# Patient Record
Sex: MALE | Race: AMERICAN INDIAN OR ALASKA NATIVE
[De-identification: names, ages, dates, MRNs, and addresses within clinical notes are randomized per-mention and may not be internally consistent; named-entity substitution may affect disease eponyms.]

---

## 2018-01-18 ENCOUNTER — HOSPITAL ENCOUNTER (EMERGENCY)
Dept: HOSPITAL 5 - ED | Age: 43
Discharge: HOME | End: 2018-01-18
Payer: COMMERCIAL

## 2018-01-18 VITALS — SYSTOLIC BLOOD PRESSURE: 131 MMHG | DIASTOLIC BLOOD PRESSURE: 84 MMHG

## 2018-01-18 DIAGNOSIS — J45.50: ICD-10-CM

## 2018-01-18 DIAGNOSIS — J20.9: Primary | ICD-10-CM

## 2018-01-18 LAB
BASOPHILS # (AUTO): 0 K/MM3 (ref 0–0.1)
BASOPHILS NFR BLD AUTO: 0.8 % (ref 0–1.8)
BUN SERPL-MCNC: 10 MG/DL (ref 9–20)
BUN/CREAT SERPL: 11 %
CALCIUM SERPL-MCNC: 9.3 MG/DL (ref 8.4–10.2)
EOSINOPHIL # BLD AUTO: 0.7 K/MM3 (ref 0–0.4)
EOSINOPHIL NFR BLD AUTO: 11 % (ref 0–4.3)
HCT VFR BLD CALC: 45.7 % (ref 35.5–45.6)
HEMOLYSIS INDEX: 11
HGB BLD-MCNC: 15.4 GM/DL (ref 11.8–15.2)
LYMPHOCYTES # BLD AUTO: 1.3 K/MM3 (ref 1.2–5.4)
LYMPHOCYTES NFR BLD AUTO: 22.4 % (ref 13.4–35)
MCH RBC QN AUTO: 30 PG (ref 28–32)
MCHC RBC AUTO-ENTMCNC: 34 % (ref 32–34)
MCV RBC AUTO: 87 FL (ref 84–94)
MONOCYTES # (AUTO): 0.6 K/MM3 (ref 0–0.8)
MONOCYTES % (AUTO): 10.9 % (ref 0–7.3)
PLATELET # BLD: 213 K/MM3 (ref 140–440)
RBC # BLD AUTO: 5.23 M/MM3 (ref 3.65–5.03)

## 2018-01-18 PROCEDURE — 71046 X-RAY EXAM CHEST 2 VIEWS: CPT

## 2018-01-18 PROCEDURE — 85379 FIBRIN DEGRADATION QUANT: CPT

## 2018-01-18 PROCEDURE — 80048 BASIC METABOLIC PNL TOTAL CA: CPT

## 2018-01-18 PROCEDURE — 94640 AIRWAY INHALATION TREATMENT: CPT

## 2018-01-18 PROCEDURE — 36415 COLL VENOUS BLD VENIPUNCTURE: CPT

## 2018-01-18 PROCEDURE — 85025 COMPLETE CBC W/AUTO DIFF WBC: CPT

## 2018-01-18 NOTE — XRAY REPORT
FINAL REPORT



PROCEDURE:  XR CHEST ROUTINE 2V



TECHNIQUE:  Two view PA lateral 



HISTORY:  persistent cough 



COMPARISON:  No prior studies are available for comparison.



FINDINGS:  

 Heart is not enlarged. Lungs are clear. No infiltrate seen 



IMPRESSION:  

Negative study

## 2018-01-18 NOTE — EMERGENCY DEPARTMENT REPORT
ED Shortness of Breath HPI





- General


Chief Complaint: Upper Respiratory Infection


Stated Complaint: COUGHING WHEEZING


Time Seen by Provider: 01/18/18 17:08


Source: patient, family


Mode of arrival: Ambulatory


Limitations: No Limitations





- History of Present Illness


Initial Comments: 


42-year-old male past medical history H. pylori, ?  Reactive airway disease 

presents with complaint of 2-3 days of intermittent wheezing.  Patient states 

he has had this issue intermittently for several months.  Patient is awake 

alert and oriented 3 fully lucid speaking in full sentences no audible 

wheezing or stridor no trismus and no drooling no visible dyspnea or 

respiratory retractions.  Patient denies chest pain abdominal pain nausea 

vomiting bodyaches sore throat fever or chills.  Denies earache.  Denies 

rhinorrhea.  Primarily complaining of intermittent cough and wheezing for 2-3 

days.  Patient denies being a smoker denies any drug use.  Accompanied by 

sister at bedside.  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 

 which he states may be associated with his respiratory 

issue.  States he was seen in urgent care center and given an albuterol inhaler 

with minimal relief of his wheezing recently.  Also states he has a history of 

acid reflux.


MD Complaint: cough


Onset/Timing: 3


-: days(s)


Consistency: intermittent


Improves With: bronchodilators


Context: allergen exposure (possible diesel fume expsoure)


Associated Symptoms: cough, sputum production


Treatments Prior to Arrival: none





- Related Data


Home Oxygen Therapy: No


 Previous Rx's











 Medication  Instructions  Recorded  Last Taken  Type


 


ALBUTEROL NEB's [Proventil 0.083% 2.5 mg IH Q4H PRN #1 box 01/18/18 Unknown Rx





NEBS]    


 


Azithromycin [Zithromax Z-RENALDO] 250 mg PO QDAY #1 pack 01/18/18 Unknown Rx


 


Benzonatate [Tessalon Perles] 100 mg PO Q8HR PRN #30 capsule 01/18/18 Unknown Rx


 


Esomeprazole Magnesium [Nexium 20 mg PO QDAY #30 capsule. 01/18/18 Unknown Rx





24Hr]    


 


Nebulizer Accessories [Sootheneb 1 each MC Q4H PRN #1 box 01/18/18 Unknown Rx





[id number] Adult Mask]    


 


Nebulizer [Compact Compressor 1 each MC Q4H PRN #1 each 01/18/18 Unknown Rx





Nebulizer]    


 


predniSONE [Deltasone] 40 mg PO QDAY #10 tab 01/18/18 Unknown Rx











 Allergies











Allergy/AdvReac Type Severity Reaction Status Date / Time


 


No Known Allergies Allergy   Unverified 01/18/18 11:40














ED Review of Systems


ROS: 


Stated complaint: COUGHING WHEEZING


Other details as noted in HPI





Constitutional: denies: chills, fever


Eyes: denies: eye pain, eye discharge, vision change


ENT: denies: ear pain, throat pain


Respiratory: cough, wheezing.  denies: shortness of breath


Cardiovascular: denies: chest pain, palpitations


Endocrine: no symptoms reported


Gastrointestinal: denies: abdominal pain, nausea, diarrhea


Genitourinary: denies: urgency, dysuria


Musculoskeletal: denies: back pain, joint swelling, arthralgia


Skin: denies: rash, lesions


Neurological: denies: headache, weakness, paresthesias


Psychiatric: denies: anxiety, depression


Hematological/Lymphatic: denies: easy bleeding, easy bruising





ED Past Medical Hx





- Past Medical History


Previous Medical History?: No





- Surgical History


Past Surgical History?: No





- Social History


Smoking Status: Never Smoker


Substance Use Type: Alcohol





- Medications


Home Medications: 


 Home Medications











 Medication  Instructions  Recorded  Confirmed  Last Taken  Type


 


ALBUTEROL NEB's [Proventil 0.083% 2.5 mg IH Q4H PRN #1 box 01/18/18  Unknown Rx





NEBS]     


 


Azithromycin [Zithromax Z-RENALDO] 250 mg PO QDAY #1 pack 01/18/18  Unknown Rx


 


Benzonatate [Tessalon Perles] 100 mg PO Q8HR PRN #30 capsule 01/18/18  Unknown 

Rx


 


Esomeprazole Magnesium [Nexium 20 mg PO QDAY #30 capsule.dr 01/18/18  Unknown Rx





24Hr]     


 


Nebulizer Accessories [Sootheneb 1 each MC Q4H PRN #1 box 01/18/18  Unknown Rx





[id number] Adult Mask]     


 


Nebulizer [Compact Compressor 1 each MC Q4H PRN #1 each 01/18/18  Unknown Rx





Nebulizer]     


 


predniSONE [Deltasone] 40 mg PO QDAY #10 tab 01/18/18  Unknown Rx














ED Physical Exam





- General


Limitations: No Limitations


General appearance: alert, in no apparent distress





- Head


Head exam: Present: atraumatic, normocephalic





- Eye


Eye exam: Present: normal appearance, PERRL, EOMI





- ENT


ENT exam: Present: mucous membranes moist





- Neck


Neck exam: Present: normal inspection





- Respiratory


Respiratory exam: Present: wheezes (fine bilateral wheezing both lung fields).  

Absent: respiratory distress





- Cardiovascular


Cardiovascular Exam: Present: regular rate, normal rhythm.  Absent: systolic 

murmur, diastolic murmur, rubs, gallop





- GI/Abdominal


GI/Abdominal exam: Present: soft (abdomen soft and nontender), normal bowel 

sounds





- Rectal


Rectal exam: Present: deferred





- Extremities Exam


Extremities exam: Present: normal inspection





- Back Exam


Back exam: Present: normal inspection





- Neurological Exam


Neurological exam: Present: alert, oriented X3





- Psychiatric


Psychiatric exam: Present: normal affect, normal mood





- Skin


Skin exam: Present: warm, dry, intact, normal color.  Absent: rash





ED Course


 Vital Signs











  01/18/18 01/18/18 01/18/18





  11:40 16:03 16:25


 


Temperature 98.4 F  


 


Pulse Rate 66  


 


Pulse Rate [  71 66





Posterior   





Bilateral   





Throughout]   


 


Respiratory 18  





Rate   


 


Respiratory  20 18





Rate [Posterior   





Bilateral   





Throughout]   


 


Blood Pressure 131/84  


 


O2 Sat by Pulse 100  





Oximetry   














  01/18/18 01/18/18





  18:26 18:41


 


Temperature  


 


Pulse Rate  


 


Pulse Rate [ 75 





Posterior  





Bilateral  





Throughout]  


 


Respiratory  





Rate  


 


Respiratory 18 16





Rate [Posterior  





Bilateral  





Throughout]  


 


Blood Pressure  


 


O2 Sat by Pulse  





Oximetry  














ED Medical Decision Making





- Lab Data


Result diagrams: 


 01/18/18 17:45





 01/18/18 17:45





- Medical Decision Making


A/P: Acute Bronchitis, possible reactive airway disease, GERD


1- patient has had intermittent symptoms for several months.  States he works 

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 

reactive airway disease


2- albuterol, prednisone course, azithromycin, Tessalon Perles


3- nexium, f/u with GI and PCP


4-follow-up with primary care and gastroenterology 


Critical care attestation.: 


If time is entered above; I have spent that time in minutes in the direct care 

of this critically ill patient, excluding procedure time.








ED Disposition


Clinical Impression: 


Acute bronchitis


Qualifiers:


 Bronchitis organism: unspecified organism Qualified Code(s): J20.9 - Acute 

bronchitis, unspecified





Reactive airway disease


Qualifiers:


 Asthma severity: severe Asthma persistence: persistent Asthma complication type

: uncomplicated Qualified Code(s): J45.50 - Severe persistent asthma, 

uncomplicated





Disposition: DC-01 TO HOME OR SELFCARE


Is pt being admited?: No


Does the pt Need Aspirin: No


Condition: Stable


Instructions:  Acute Bronchitis (ED), Gastroesophageal Reflux Disease (ED), 

Reactive Airways Disease (ED)


Prescriptions: 


ALBUTEROL NEB's [Proventil 0.083% NEBS] 2.5 mg IH Q4H PRN #1 box


 PRN Reason: Wheezing


Azithromycin [Zithromax Z-RENALDO] 250 mg PO QDAY #1 pack


Benzonatate [Tessalon Perles] 100 mg PO Q8HR PRN #30 capsule


 PRN Reason: Cough


Esomeprazole Magnesium [Nexium 24Hr] 20 mg PO QDAY #30 capsule.dr


Nebulizer [Compact Compressor Nebulizer] 1 each MC Q4H PRN #1 each


 PRN Reason: Wheezing


Nebulizer Accessories [Sootheneb [id number] Adult Mask] 1 each MC Q4H PRN #1 box


 PRN Reason: Wheezing


predniSONE [Deltasone] 40 mg PO QDAY #10 tab


Referrals: 


[facility] [Outside] - 3-5 Days


[facility] [Outside] - 3-5 Days


[facility] [Provider Group] - 3-5 Days


Forms:  Accompanied Note, Work/School Release Form(ED)


Time of Disposition: 18:53